# Patient Record
Sex: MALE | Race: WHITE | URBAN - METROPOLITAN AREA
[De-identification: names, ages, dates, MRNs, and addresses within clinical notes are randomized per-mention and may not be internally consistent; named-entity substitution may affect disease eponyms.]

---

## 2024-07-01 ENCOUNTER — APPOINTMENT (RX ONLY)
Dept: URBAN - METROPOLITAN AREA CLINIC 154 | Facility: CLINIC | Age: 69
Setting detail: DERMATOLOGY
End: 2024-07-01

## 2024-07-01 DIAGNOSIS — L21.8 OTHER SEBORRHEIC DERMATITIS: ICD-10-CM | Status: INADEQUATELY CONTROLLED

## 2024-07-01 DIAGNOSIS — L30.8 OTHER SPECIFIED DERMATITIS: ICD-10-CM | Status: INADEQUATELY CONTROLLED

## 2024-07-01 PROCEDURE — ? COUNSELING

## 2024-07-01 PROCEDURE — ? MDM - TREATMENT GOALS

## 2024-07-01 PROCEDURE — ? TREATMENT REGIMEN

## 2024-07-01 PROCEDURE — 99204 OFFICE O/P NEW MOD 45 MIN: CPT

## 2024-07-01 PROCEDURE — ? PRESCRIPTION

## 2024-07-01 RX ORDER — KETOCONAZOLE 20 MG/G
SMALL AMOUNT CREAM TOPICAL BID PRN
Qty: 60 | Refills: 3 | Status: ERX | COMMUNITY
Start: 2024-07-01

## 2024-07-01 RX ADMIN — KETOCONAZOLE SMALL AMOUNT: 20 CREAM TOPICAL at 00:00

## 2024-07-01 ASSESSMENT — LOCATION DETAILED DESCRIPTION DERM
LOCATION DETAILED: RIGHT THENAR EMINENCE
LOCATION DETAILED: LEFT CYMBA CONCHA
LOCATION DETAILED: RIGHT INFERIOR CRUS OF ANTIHELIX
LOCATION DETAILED: LEFT INFERIOR PARIETAL SCALP
LOCATION DETAILED: LEFT RADIAL PALM
LOCATION DETAILED: RIGHT SUPERIOR POSTAURICULAR SKIN

## 2024-07-01 ASSESSMENT — SEVERITY ASSESSMENT: SEVERITY: MODERATE

## 2024-07-01 ASSESSMENT — LOCATION SIMPLE DESCRIPTION DERM
LOCATION SIMPLE: LEFT EAR
LOCATION SIMPLE: RIGHT EAR
LOCATION SIMPLE: SCALP
LOCATION SIMPLE: LEFT HAND
LOCATION SIMPLE: RIGHT HAND

## 2024-07-01 ASSESSMENT — LOCATION ZONE DERM
LOCATION ZONE: EAR
LOCATION ZONE: HAND
LOCATION ZONE: SCALP

## 2024-07-01 ASSESSMENT — ITCH NUMERIC RATING SCALE: HOW SEVERE IS YOUR ITCHING?: 5

## 2024-07-01 NOTE — PROCEDURE: TREATMENT REGIMEN
Action 4: Continue
Detail Level: Zone
Initiate Treatment: ketoconazole 2 % topical cream BID prn\\n\\nRecommended pt to use Head and shoulders shampoo as well as other anti fungal shampoos over the counter (pt declined rx of keto shampoo today)
Detail Level: Simple
Plan: Recommended pt to use wet/ damp hands before bedtime and apply thick moisturizer. After moisturizer pt can use socks or gloves on hands for nighttime.
Samples Given: Cetaphil hydrating cleanser \\nAveeno moisturizing lotion\\nAveeno hand cream\\nCetaphil soothing wash\\nAquaphor
Initiate Treatment: Lukewarm water only \\n\\nWash hands with hydrating cleansers like CeraVe, Cetaphil, Aveeno\\n\\nUse gloves while washing dishes or handling chemicals
Discontinue Regimen: Alia dish soap\\n\\nHot water when washing hands
Continue Regimen: Betamethasone cream twice daily for two weeks (pt has at home from pcp)\\n\\nAloe Vera plant

## 2024-08-12 ENCOUNTER — APPOINTMENT (RX ONLY)
Dept: URBAN - METROPOLITAN AREA CLINIC 154 | Facility: CLINIC | Age: 69
Setting detail: DERMATOLOGY
End: 2024-08-12

## 2024-08-12 DIAGNOSIS — L40.0 PSORIASIS VULGARIS: ICD-10-CM | Status: INADEQUATELY CONTROLLED

## 2024-08-12 PROCEDURE — ? COUNSELING

## 2024-08-12 PROCEDURE — 99214 OFFICE O/P EST MOD 30 MIN: CPT

## 2024-08-12 PROCEDURE — ? PRESCRIPTION

## 2024-08-12 RX ORDER — CLOBETASOL PROPIONATE 0.5 MG/G
OINTMENT TOPICAL BID
Qty: 60 | Refills: 2 | Status: ERX | COMMUNITY
Start: 2024-08-12

## 2024-08-12 RX ORDER — ROFLUMILAST 3 MG/G
CREAM TOPICAL
Qty: 60 | Refills: 3 | Status: ERX | COMMUNITY
Start: 2024-08-12

## 2024-08-12 RX ADMIN — ROFLUMILAST: 3 CREAM TOPICAL at 00:00

## 2024-08-12 RX ADMIN — CLOBETASOL PROPIONATE: 0.5 OINTMENT TOPICAL at 00:00

## 2024-08-12 ASSESSMENT — LOCATION DETAILED DESCRIPTION DERM: LOCATION DETAILED: LEFT RADIAL PALM

## 2024-08-12 ASSESSMENT — LOCATION SIMPLE DESCRIPTION DERM: LOCATION SIMPLE: LEFT HAND

## 2024-08-12 ASSESSMENT — LOCATION ZONE DERM: LOCATION ZONE: HAND
